# Patient Record
(demographics unavailable — no encounter records)

---

## 2025-07-28 NOTE — HISTORY OF PRESENT ILLNESS
[FreeTextEntry1] : Pt is a 31 y/o woman, hx Factor V Leiden, 12 weeks pregnant who presents with positive IgM and IgG for varicella.  Pt does not have a rash and testing was done for screening purposes.  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  Pt feels well and is without complaints.

## 2025-07-28 NOTE — REASON FOR VISIT
[Consultation] : a consultation visit [FreeTextEntry1] : New pt referred by [facility] (Dr. Crooks) for + Varicella AB

## 2025-07-28 NOTE — ASSESSMENT
[FreeTextEntry1] : Pt is a 29 y/o woman, hx Factor V Leiden, 12 weeks pregnant who presents with positive IgM and IgG for varicella.  Pt does not have a rash and testing was done for screening purposes.  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  Positive IgM likely a false positive.  IgG is secondary to vaccination.    Given lack of symptoms or exposure risk of acyclovir outweigh benefits.  Rec: 1. Cont supportive care off antivirals 2. Serial fetal ultrasounds as planned  30 min spent on encounter,  Return if needed.